# Patient Record
Sex: MALE | Race: WHITE | Employment: FULL TIME | URBAN - METROPOLITAN AREA
[De-identification: names, ages, dates, MRNs, and addresses within clinical notes are randomized per-mention and may not be internally consistent; named-entity substitution may affect disease eponyms.]

---

## 2024-07-03 ENCOUNTER — HOSPITAL ENCOUNTER (OUTPATIENT)
Age: 34
Setting detail: SPECIMEN
Discharge: HOME | End: 2024-07-03

## 2024-07-03 ENCOUNTER — OFFICE VISIT (OUTPATIENT)
Dept: FAMILY MEDICINE CLINIC | Age: 34
End: 2024-07-03
Payer: COMMERCIAL

## 2024-07-03 VITALS
DIASTOLIC BLOOD PRESSURE: 90 MMHG | TEMPERATURE: 97.2 F | HEART RATE: 88 BPM | BODY MASS INDEX: 22.4 KG/M2 | OXYGEN SATURATION: 98 % | WEIGHT: 169 LBS | HEIGHT: 73 IN | SYSTOLIC BLOOD PRESSURE: 170 MMHG

## 2024-07-03 DIAGNOSIS — Z13.1 DIABETES MELLITUS SCREENING: ICD-10-CM

## 2024-07-03 DIAGNOSIS — Z11.4 SCREENING FOR HIV (HUMAN IMMUNODEFICIENCY VIRUS): ICD-10-CM

## 2024-07-03 DIAGNOSIS — Z13.220 SCREENING FOR CHOLESTEROL LEVEL: ICD-10-CM

## 2024-07-03 DIAGNOSIS — E55.9 VITAMIN D INSUFFICIENCY: ICD-10-CM

## 2024-07-03 DIAGNOSIS — Z00.00 ROUTINE HEALTH MAINTENANCE: ICD-10-CM

## 2024-07-03 DIAGNOSIS — Z11.59 NEED FOR HEPATITIS C SCREENING TEST: ICD-10-CM

## 2024-07-03 DIAGNOSIS — I10 PRIMARY HYPERTENSION: ICD-10-CM

## 2024-07-03 DIAGNOSIS — Z76.89 ENCOUNTER TO ESTABLISH CARE: Primary | ICD-10-CM

## 2024-07-03 DIAGNOSIS — Z13.31 DEPRESSION SCREENING: ICD-10-CM

## 2024-07-03 LAB
25(OH)D3 SERPL-MCNC: 29.2 NG/ML (ref 30–100)
ALBUMIN SERPL-MCNC: 4.8 G/DL (ref 3.5–5.2)
ALBUMIN/GLOB SERPL: 2 {RATIO} (ref 1–2.5)
ALP SERPL-CCNC: 60 U/L (ref 40–129)
ALT SERPL-CCNC: 15 U/L (ref 10–50)
ANION GAP SERPL CALCULATED.3IONS-SCNC: 10 MMOL/L (ref 9–16)
AST SERPL-CCNC: 18 U/L (ref 10–50)
BASOPHILS # BLD: 0.12 K/UL (ref 0–0.2)
BASOPHILS NFR BLD: 2 % (ref 0–2)
BILIRUB SERPL-MCNC: 0.3 MG/DL (ref 0–1.2)
BILIRUB UR QL STRIP: NEGATIVE
BUN SERPL-MCNC: 14 MG/DL (ref 6–20)
CALCIUM SERPL-MCNC: 9.3 MG/DL (ref 8.6–10.4)
CHLORIDE SERPL-SCNC: 102 MMOL/L (ref 98–107)
CHOLEST SERPL-MCNC: 259 MG/DL (ref 0–199)
CHOLESTEROL/HDL RATIO: 4
CLARITY UR: CLEAR
CO2 SERPL-SCNC: 27 MMOL/L (ref 20–31)
COLOR UR: YELLOW
COMMENT: ABNORMAL
CREAT SERPL-MCNC: 1 MG/DL (ref 0.7–1.2)
EOSINOPHIL # BLD: 0.11 K/UL (ref 0–0.44)
EOSINOPHILS RELATIVE PERCENT: 2 % (ref 1–4)
ERYTHROCYTE [DISTWIDTH] IN BLOOD BY AUTOMATED COUNT: 11.9 % (ref 11.8–14.4)
EST. AVERAGE GLUCOSE BLD GHB EST-MCNC: 97 MG/DL
GFR, ESTIMATED: >90 ML/MIN/1.73M2
GLUCOSE SERPL-MCNC: 90 MG/DL (ref 74–99)
GLUCOSE UR STRIP-MCNC: NEGATIVE MG/DL
HBA1C MFR BLD: 5 % (ref 4–6)
HCT VFR BLD AUTO: 41.3 % (ref 40.7–50.3)
HCV AB SERPL QL IA: NONREACTIVE
HDLC SERPL-MCNC: 71 MG/DL
HGB BLD-MCNC: 13.2 G/DL (ref 13–17)
HGB UR QL STRIP.AUTO: NEGATIVE
HIV 1+2 AB+HIV1 P24 AG SERPL QL IA: NONREACTIVE
IMM GRANULOCYTES # BLD AUTO: 0.03 K/UL (ref 0–0.3)
IMM GRANULOCYTES NFR BLD: 1 %
KETONES UR STRIP-MCNC: ABNORMAL MG/DL
LDLC SERPL CALC-MCNC: 139 MG/DL (ref 0–100)
LEUKOCYTE ESTERASE UR QL STRIP: NEGATIVE
LYMPHOCYTES NFR BLD: 2.01 K/UL (ref 1.1–3.7)
LYMPHOCYTES RELATIVE PERCENT: 30 % (ref 24–43)
MAGNESIUM SERPL-MCNC: 1.9 MG/DL (ref 1.6–2.6)
MCH RBC QN AUTO: 28.9 PG (ref 25.2–33.5)
MCHC RBC AUTO-ENTMCNC: 32 G/DL (ref 28.4–34.8)
MCV RBC AUTO: 90.6 FL (ref 82.6–102.9)
MONOCYTES NFR BLD: 0.5 K/UL (ref 0.1–1.2)
MONOCYTES NFR BLD: 8 % (ref 3–12)
NEUTROPHILS NFR BLD: 57 % (ref 36–65)
NEUTS SEG NFR BLD: 3.84 K/UL (ref 1.5–8.1)
NITRITE UR QL STRIP: NEGATIVE
NRBC BLD-RTO: 0 PER 100 WBC
PH UR STRIP: 5.5 [PH] (ref 5–8)
PLATELET # BLD AUTO: 310 K/UL (ref 138–453)
PMV BLD AUTO: 10.3 FL (ref 8.1–13.5)
POTASSIUM SERPL-SCNC: 4.8 MMOL/L (ref 3.7–5.3)
PROT SERPL-MCNC: 7.4 G/DL (ref 6.6–8.7)
PROT UR STRIP-MCNC: NEGATIVE MG/DL
RBC # BLD AUTO: 4.56 M/UL (ref 4.21–5.77)
SODIUM SERPL-SCNC: 139 MMOL/L (ref 136–145)
SP GR UR STRIP: 1.02 (ref 1–1.03)
TRIGL SERPL-MCNC: 246 MG/DL
TSH SERPL DL<=0.05 MIU/L-ACNC: 1.24 UIU/ML (ref 0.27–4.2)
UROBILINOGEN UR STRIP-ACNC: NORMAL EU/DL (ref 0–1)
VLDLC SERPL CALC-MCNC: 49 MG/DL
WBC OTHER # BLD: 6.6 K/UL (ref 3.5–11.3)

## 2024-07-03 PROCEDURE — 3080F DIAST BP >= 90 MM HG: CPT | Performed by: FAMILY MEDICINE

## 2024-07-03 PROCEDURE — 99385 PREV VISIT NEW AGE 18-39: CPT | Performed by: FAMILY MEDICINE

## 2024-07-03 PROCEDURE — 3077F SYST BP >= 140 MM HG: CPT | Performed by: FAMILY MEDICINE

## 2024-07-03 PROCEDURE — 93000 ELECTROCARDIOGRAM COMPLETE: CPT | Performed by: FAMILY MEDICINE

## 2024-07-03 RX ORDER — LISINOPRIL 10 MG/1
10 TABLET ORAL DAILY
Qty: 30 TABLET | Refills: 1 | Status: SHIPPED | OUTPATIENT
Start: 2024-07-03

## 2024-07-03 SDOH — ECONOMIC STABILITY: HOUSING INSECURITY
IN THE LAST 12 MONTHS, WAS THERE A TIME WHEN YOU DID NOT HAVE A STEADY PLACE TO SLEEP OR SLEPT IN A SHELTER (INCLUDING NOW)?: NO

## 2024-07-03 SDOH — ECONOMIC STABILITY: FOOD INSECURITY: WITHIN THE PAST 12 MONTHS, THE FOOD YOU BOUGHT JUST DIDN'T LAST AND YOU DIDN'T HAVE MONEY TO GET MORE.: NEVER TRUE

## 2024-07-03 SDOH — ECONOMIC STABILITY: INCOME INSECURITY: HOW HARD IS IT FOR YOU TO PAY FOR THE VERY BASICS LIKE FOOD, HOUSING, MEDICAL CARE, AND HEATING?: NOT HARD AT ALL

## 2024-07-03 SDOH — ECONOMIC STABILITY: FOOD INSECURITY: WITHIN THE PAST 12 MONTHS, YOU WORRIED THAT YOUR FOOD WOULD RUN OUT BEFORE YOU GOT MONEY TO BUY MORE.: NEVER TRUE

## 2024-07-03 ASSESSMENT — ENCOUNTER SYMPTOMS
ALLERGIC/IMMUNOLOGIC NEGATIVE: 1
GASTROINTESTINAL NEGATIVE: 1
SHORTNESS OF BREATH: 0
EYES NEGATIVE: 1
RESPIRATORY NEGATIVE: 1

## 2024-07-03 ASSESSMENT — PATIENT HEALTH QUESTIONNAIRE - PHQ9
SUM OF ALL RESPONSES TO PHQ QUESTIONS 1-9: 0
SUM OF ALL RESPONSES TO PHQ9 QUESTIONS 1 & 2: 0
2. FEELING DOWN, DEPRESSED OR HOPELESS: NOT AT ALL
1. LITTLE INTEREST OR PLEASURE IN DOING THINGS: NOT AT ALL
SUM OF ALL RESPONSES TO PHQ QUESTIONS 1-9: 0

## 2024-07-03 NOTE — PROGRESS NOTES
[facility]     Date of Visit:  7/3/2024  Patient Name: Jace Phillip   Patient :  1990     CHIEF COMPLAINT:     Jace Phillip is a 34 y.o. male who presents today for an general visit to be evaluated for the following condition(s):    Chief Complaint   Patient presents with    Establish Care     New to Clinic and provider.    General Check Up / Physical     Due for check up / wellness exam.  Multiple health maintenance items are due.    Blood Pressure Elevation     Is open to medication.        HISTORY OF PRESENT ILLNESS:         New to clinic and provider.  Needs a routine check up / wellness exam.  Engaged and expecting his first child in 2024.  Works in construction.  Available medical records reviewed this morning.  Chart was updated.    Hypertension  This is a new problem. The current episode started more than 1 month ago. The problem is unchanged. The problem is uncontrolled. Pertinent negatives include no chest pain, headaches, malaise/fatigue, palpitations, peripheral edema or shortness of breath. Risk factors for coronary artery disease include male gender, family history and smoking/tobacco exposure. Past treatments include nothing. There are no compliance problems.         REVIEW OF SYSTEMS:        Review of Systems   Constitutional: Negative.  Negative for malaise/fatigue.   HENT: Negative.     Eyes: Negative.    Respiratory: Negative.  Negative for shortness of breath.    Cardiovascular: Negative.  Negative for chest pain and palpitations.   Gastrointestinal: Negative.    Endocrine: Negative.    Genitourinary: Negative.    Musculoskeletal: Negative.    Skin: Negative.    Allergic/Immunologic: Negative.    Neurological: Negative.  Negative for headaches.   Hematological: Negative.    Psychiatric/Behavioral: Negative.          REVIEWED INFORMATION      Current Outpatient Medications   Medication Sig Dispense Refill    lisinopril (PRINIVIL;ZESTRIL) 10 MG tablet Take

## 2024-07-03 NOTE — PROGRESS NOTES
Visit Information    Have you changed or started any medications since your last visit including any over-the-counter medicines, vitamins, or herbal medicines? no   Are you having any side effects from any of your medications? -  no  Have you stopped taking any of your medications? Is so, why? -  no    Have you seen any other physician or provider since your last visit? No  Have you had any other diagnostic tests since your last visit? No  Have you been seen in the emergency room and/or had an admission to a hospital since we last saw you? No  Have you had your routine dental cleaning in the past 6 months? no    Have you activated your [employer] account? If not, what are your barriers? No:      Patient Care Team:  Levi Martin DO as PCP - General (Family Medicine)    Medical History Review  Past Medical, Family, and Social History reviewed and does not contribute to the patient presenting condition    Health Maintenance   Topic Date Due    COVID-19 Vaccine (1) Never done    Depression Screen  Never done    HIV screen  Never done    Hepatitis C screen  Never done    DTaP/Tdap/Td vaccine (1 - Tdap) Never done    Hepatitis B vaccine (2 of 3 - Hep B Twinrix 3-dose series) 02/08/2008    Polio vaccine (2 of 3 - Adult catch-up series) 02/08/2008    Varicella vaccine (2 of 2 - 13+ 2-dose series) 02/08/2008    Flu vaccine (1) 08/01/2024    Hepatitis A vaccine  Aged Out    Hib vaccine  Aged Out    HPV vaccine  Aged Out    Meningococcal (ACWY) vaccine  Aged Out    Pneumococcal 0-64 years Vaccine  Aged Out

## 2024-08-02 ENCOUNTER — OFFICE VISIT (OUTPATIENT)
Dept: FAMILY MEDICINE CLINIC | Age: 34
End: 2024-08-02

## 2024-08-02 VITALS
DIASTOLIC BLOOD PRESSURE: 84 MMHG | BODY MASS INDEX: 22.64 KG/M2 | OXYGEN SATURATION: 99 % | HEART RATE: 71 BPM | WEIGHT: 171.6 LBS | SYSTOLIC BLOOD PRESSURE: 126 MMHG

## 2024-08-02 DIAGNOSIS — I10 PRIMARY HYPERTENSION: ICD-10-CM

## 2024-08-02 DIAGNOSIS — E78.2 MIXED HYPERLIPIDEMIA: ICD-10-CM

## 2024-08-02 DIAGNOSIS — E55.9 VITAMIN D INSUFFICIENCY: ICD-10-CM

## 2024-08-02 DIAGNOSIS — Z09 FOLLOW-UP EXAMINATION: Primary | ICD-10-CM

## 2024-08-02 DIAGNOSIS — Z71.89 ENCOUNTER FOR MEDICATION REVIEW AND COUNSELING: ICD-10-CM

## 2024-08-02 RX ORDER — LISINOPRIL 10 MG/1
10 TABLET ORAL DAILY
Qty: 90 TABLET | Refills: 1 | Status: SHIPPED | OUTPATIENT
Start: 2024-08-02

## 2024-08-02 ASSESSMENT — ENCOUNTER SYMPTOMS
ALLERGIC/IMMUNOLOGIC NEGATIVE: 1
EYES NEGATIVE: 1
RESPIRATORY NEGATIVE: 1
GASTROINTESTINAL NEGATIVE: 1

## 2024-08-02 NOTE — PROGRESS NOTES
[facility]     Date of Visit:  2024  Patient Name: Jace Phillip   Patient :  1990     CHIEF COMPLAINT:     Jace Phillip is a 34 y.o. male who presents today for an general visit to be evaluated for the following condition(s):    Chief Complaint   Patient presents with    Follow-up     Here for 1 month follow up.    Hypertension     Here for recheck of BP - medication started last visit.    Discuss Labs     Recent labs reviewed - see reports.       HISTORY OF PRESENT ILLNESS:         Hypertension  The current episode started more than 1 month ago. The problem has been gradually improving since onset. Risk factors for coronary artery disease include family history, male gender, smoking/tobacco exposure and dyslipidemia. Past treatments include ACE inhibitors. The current treatment provides moderate improvement. There are no compliance problems.         REVIEW OF SYSTEMS:        Review of Systems   Constitutional: Negative.    HENT: Negative.     Eyes: Negative.    Respiratory: Negative.     Cardiovascular: Negative.    Gastrointestinal: Negative.    Endocrine: Negative.    Genitourinary: Negative.    Musculoskeletal: Negative.    Skin: Negative.    Allergic/Immunologic: Negative.    Neurological: Negative.    Hematological: Negative.    Psychiatric/Behavioral: Negative.          REVIEWED INFORMATION      Current Outpatient Medications   Medication Sig Dispense Refill    lisinopril (PRINIVIL;ZESTRIL) 10 MG tablet Take 1 tablet by mouth daily 90 tablet 1     No current facility-administered medications for this visit.        Allergies   Allergen Reactions    Cefaclor Other (See Comments)       There is no problem list on file for this patient.      History reviewed. No pertinent past medical history.    Past Surgical History:   Procedure Laterality Date    NASAL SEPTUM SURGERY          Social History     Socioeconomic History    Marital status: Single     Spouse name: None    Number

## 2024-08-25 DIAGNOSIS — I10 PRIMARY HYPERTENSION: ICD-10-CM

## 2024-08-26 DIAGNOSIS — I10 PRIMARY HYPERTENSION: ICD-10-CM

## 2024-08-26 RX ORDER — LISINOPRIL 10 MG/1
10 TABLET ORAL DAILY
Qty: 30 TABLET | OUTPATIENT
Start: 2024-08-26

## 2024-08-26 RX ORDER — LISINOPRIL 10 MG/1
10 TABLET ORAL DAILY
Qty: 90 TABLET | Refills: 1 | Status: SHIPPED | OUTPATIENT
Start: 2024-08-26

## 2024-12-17 ENCOUNTER — OFFICE VISIT (OUTPATIENT)
Dept: FAMILY MEDICINE CLINIC | Age: 34
End: 2024-12-17
Payer: COMMERCIAL

## 2024-12-17 VITALS
TEMPERATURE: 99.3 F | OXYGEN SATURATION: 99 % | HEIGHT: 73 IN | WEIGHT: 176 LBS | SYSTOLIC BLOOD PRESSURE: 130 MMHG | HEART RATE: 69 BPM | DIASTOLIC BLOOD PRESSURE: 60 MMHG | BODY MASS INDEX: 23.33 KG/M2

## 2024-12-17 DIAGNOSIS — Z71.89 ENCOUNTER FOR MEDICATION REVIEW AND COUNSELING: ICD-10-CM

## 2024-12-17 DIAGNOSIS — Z09 FOLLOW-UP EXAMINATION: Primary | ICD-10-CM

## 2024-12-17 DIAGNOSIS — E78.2 MIXED HYPERLIPIDEMIA: ICD-10-CM

## 2024-12-17 DIAGNOSIS — I10 PRIMARY HYPERTENSION: ICD-10-CM

## 2024-12-17 DIAGNOSIS — R42 ORTHOSTATIC DIZZINESS: ICD-10-CM

## 2024-12-17 PROCEDURE — 99213 OFFICE O/P EST LOW 20 MIN: CPT | Performed by: FAMILY MEDICINE

## 2024-12-17 PROCEDURE — 3078F DIAST BP <80 MM HG: CPT | Performed by: FAMILY MEDICINE

## 2024-12-17 PROCEDURE — 3075F SYST BP GE 130 - 139MM HG: CPT | Performed by: FAMILY MEDICINE

## 2024-12-17 RX ORDER — LISINOPRIL AND HYDROCHLOROTHIAZIDE 10; 12.5 MG/1; MG/1
1 TABLET ORAL DAILY
Qty: 90 TABLET | Refills: 0 | Status: SHIPPED | OUTPATIENT
Start: 2024-12-17

## 2024-12-17 ASSESSMENT — ENCOUNTER SYMPTOMS
RESPIRATORY NEGATIVE: 1
EYES NEGATIVE: 1
ALLERGIC/IMMUNOLOGIC NEGATIVE: 1
GASTROINTESTINAL NEGATIVE: 1

## 2024-12-17 NOTE — PROGRESS NOTES
[facility]     Date of Visit:  2024  Patient Name: Jace Phillip   Patient :  1990     CHIEF COMPLAINT:     Jace Phillip is a 34 y.o. male who presents today for an general visit to be evaluated for the following condition(s):    Chief Complaint   Patient presents with    Follow-up     Here in follow up to 2024 visit.  Please reference that notation.    Hypertension     On Lisinopril 10mg daily.  BP up as of late.  Associated with some dizziness. When getting up from sitting position for about 4-5 days        HISTORY OF PRESENT ILLNESS:         Hypertension  This is a chronic problem. The current episode started more than 1 month ago. The problem has been waxing and waning since onset. (dizziness) Risk factors for coronary artery disease include male gender, smoking/tobacco exposure, family history and dyslipidemia. The current treatment provides significant improvement.        REVIEW OF SYSTEMS:        Review of Systems   Constitutional: Negative.    HENT: Negative.     Eyes: Negative.    Respiratory: Negative.     Cardiovascular: Negative.    Gastrointestinal: Negative.    Endocrine: Negative.    Genitourinary: Negative.    Musculoskeletal: Negative.    Skin: Negative.    Allergic/Immunologic: Negative.    Neurological:  Positive for dizziness.   Hematological: Negative.    Psychiatric/Behavioral: Negative.          REVIEWED INFORMATION      Current Outpatient Medications   Medication Sig Dispense Refill    lisinopril-hydroCHLOROthiazide (PRINZIDE;ZESTORETIC) 10-12.5 MG per tablet Take 1 tablet by mouth daily 90 tablet 0     No current facility-administered medications for this visit.        Allergies   Allergen Reactions    Cefaclor Other (See Comments)       There is no problem list on file for this patient.      History reviewed. No pertinent past medical history.    Past Surgical History:   Procedure Laterality Date    NASAL SEPTUM SURGERY          Social History

## 2025-02-04 ENCOUNTER — OFFICE VISIT (OUTPATIENT)
Dept: FAMILY MEDICINE CLINIC | Age: 35
End: 2025-02-04
Payer: COMMERCIAL

## 2025-02-04 VITALS
BODY MASS INDEX: 23.59 KG/M2 | HEART RATE: 60 BPM | WEIGHT: 178 LBS | OXYGEN SATURATION: 99 % | SYSTOLIC BLOOD PRESSURE: 132 MMHG | RESPIRATION RATE: 16 BRPM | HEIGHT: 73 IN | DIASTOLIC BLOOD PRESSURE: 84 MMHG

## 2025-02-04 DIAGNOSIS — I10 PRIMARY HYPERTENSION: ICD-10-CM

## 2025-02-04 DIAGNOSIS — Z71.89 ENCOUNTER FOR MEDICATION REVIEW AND COUNSELING: ICD-10-CM

## 2025-02-04 DIAGNOSIS — E78.2 MIXED HYPERLIPIDEMIA: ICD-10-CM

## 2025-02-04 DIAGNOSIS — Z09 FOLLOW-UP EXAMINATION: Primary | ICD-10-CM

## 2025-02-04 PROCEDURE — 3079F DIAST BP 80-89 MM HG: CPT | Performed by: FAMILY MEDICINE

## 2025-02-04 PROCEDURE — 3075F SYST BP GE 130 - 139MM HG: CPT | Performed by: FAMILY MEDICINE

## 2025-02-04 PROCEDURE — 99213 OFFICE O/P EST LOW 20 MIN: CPT | Performed by: FAMILY MEDICINE

## 2025-02-04 ASSESSMENT — ENCOUNTER SYMPTOMS
EYES NEGATIVE: 1
RESPIRATORY NEGATIVE: 1
ALLERGIC/IMMUNOLOGIC NEGATIVE: 1
GASTROINTESTINAL NEGATIVE: 1

## 2025-02-04 ASSESSMENT — PATIENT HEALTH QUESTIONNAIRE - PHQ9
SUM OF ALL RESPONSES TO PHQ QUESTIONS 1-9: 0
1. LITTLE INTEREST OR PLEASURE IN DOING THINGS: NOT AT ALL
SUM OF ALL RESPONSES TO PHQ QUESTIONS 1-9: 0
SUM OF ALL RESPONSES TO PHQ9 QUESTIONS 1 & 2: 0
SUM OF ALL RESPONSES TO PHQ9 QUESTIONS 1 & 2: 0
2. FEELING DOWN, DEPRESSED OR HOPELESS: NOT AT ALL
SUM OF ALL RESPONSES TO PHQ QUESTIONS 1-9: 0
1. LITTLE INTEREST OR PLEASURE IN DOING THINGS: NOT AT ALL
SUM OF ALL RESPONSES TO PHQ QUESTIONS 1-9: 0
2. FEELING DOWN, DEPRESSED OR HOPELESS: NOT AT ALL

## 2025-02-04 NOTE — PROGRESS NOTES
status: Single     Spouse name: None    Number of children: None    Years of education: None    Highest education level: None   Tobacco Use    Smoking status: Never    Smokeless tobacco: Current     Types: Chew   Vaping Use    Vaping status: Never Used   Substance and Sexual Activity    Alcohol use: Yes     Alcohol/week: 6.0 standard drinks of alcohol     Types: 6 Cans of beer per week    Drug use: Never    Sexual activity: Yes     Partners: Female     Social Determinants of Health     Financial Resource Strain: Low Risk  (7/3/2024)    Overall Financial Resource Strain (CARDIA)     Difficulty of Paying Living Expenses: Not hard at all   Food Insecurity: No Food Insecurity (7/3/2024)    Hunger Vital Sign     Worried About Running Out of Food in the Last Year: Never true     Ran Out of Food in the Last Year: Never true   Transportation Needs: Unknown (7/3/2024)    PRAPARE - Transportation     Lack of Transportation (Non-Medical): No   Housing Stability: Unknown (7/3/2024)    Housing Stability Vital Sign     Unstable Housing in the Last Year: No        Family History   Problem Relation Age of Onset    Hypertension Father         PHYSICAL EXAM:       /84 (Site: Right Upper Arm, Position: Sitting, Cuff Size: Medium Adult)   Pulse 60   Resp 16   Ht 1.854 m (6' 1\")   Wt 80.7 kg (178 lb)   SpO2 99%   BMI 23.48 kg/m²        Physical Exam  Vitals and nursing note reviewed.   Constitutional:       General: He is not in acute distress.     Appearance: Normal appearance. He is normal weight. He is not ill-appearing, toxic-appearing or diaphoretic.   HENT:      Head: Normocephalic and atraumatic.      Right Ear: Tympanic membrane, ear canal and external ear normal.      Left Ear: Tympanic membrane, ear canal and external ear normal.      Nose: Nose normal. No congestion or rhinorrhea.      Mouth/Throat:      Mouth: Mucous membranes are moist.      Pharynx: Oropharynx is clear. No oropharyngeal exudate or posterior

## 2025-02-28 DIAGNOSIS — I10 PRIMARY HYPERTENSION: ICD-10-CM

## 2025-02-28 RX ORDER — LISINOPRIL 10 MG/1
10 TABLET ORAL DAILY
Qty: 90 TABLET | Refills: 1 | OUTPATIENT
Start: 2025-02-28

## 2025-02-28 RX ORDER — LISINOPRIL AND HYDROCHLOROTHIAZIDE 10; 12.5 MG/1; MG/1
1 TABLET ORAL DAILY
Qty: 90 TABLET | Refills: 1 | Status: SHIPPED | OUTPATIENT
Start: 2025-02-28

## 2025-03-19 DIAGNOSIS — I10 PRIMARY HYPERTENSION: ICD-10-CM

## 2025-03-19 RX ORDER — LISINOPRIL AND HYDROCHLOROTHIAZIDE 10; 12.5 MG/1; MG/1
1 TABLET ORAL DAILY
Qty: 90 TABLET | Refills: 1 | Status: SHIPPED | OUTPATIENT
Start: 2025-03-19

## 2025-03-19 NOTE — TELEPHONE ENCOUNTER
Jace Phillip is calling to request a refill on the following medication(s):    Last Visit Date (If Applicable):  2/4/2025    Next Visit Date:    8/5/2025    Medication Request:  Requested Prescriptions     Pending Prescriptions Disp Refills    lisinopril-hydroCHLOROthiazide (PRINZIDE;ZESTORETIC) 10-12.5 MG per tablet [Pharmacy Med Name: LISINOPRIL-HCTZ 10/12.5MG TABLETS] 90 tablet 1     Sig: TAKE 1 TABLET BY MOUTH DAILY

## 2025-08-05 ENCOUNTER — OFFICE VISIT (OUTPATIENT)
Dept: FAMILY MEDICINE CLINIC | Age: 35
End: 2025-08-05
Payer: COMMERCIAL

## 2025-08-05 VITALS
DIASTOLIC BLOOD PRESSURE: 78 MMHG | SYSTOLIC BLOOD PRESSURE: 128 MMHG | WEIGHT: 161 LBS | HEIGHT: 73 IN | OXYGEN SATURATION: 100 % | BODY MASS INDEX: 21.34 KG/M2 | HEART RATE: 62 BPM

## 2025-08-05 DIAGNOSIS — Z13.1 DIABETES MELLITUS SCREENING: ICD-10-CM

## 2025-08-05 DIAGNOSIS — Z13.31 DEPRESSION SCREENING: ICD-10-CM

## 2025-08-05 DIAGNOSIS — Z00.00 ROUTINE HEALTH MAINTENANCE: Primary | ICD-10-CM

## 2025-08-05 DIAGNOSIS — I10 PRIMARY HYPERTENSION: ICD-10-CM

## 2025-08-05 DIAGNOSIS — Z11.59 NEED FOR HEPATITIS B SCREENING TEST: ICD-10-CM

## 2025-08-05 DIAGNOSIS — E55.9 VITAMIN D INSUFFICIENCY: ICD-10-CM

## 2025-08-05 DIAGNOSIS — Z71.89 ENCOUNTER FOR MEDICATION REVIEW AND COUNSELING: ICD-10-CM

## 2025-08-05 DIAGNOSIS — E78.2 MIXED HYPERLIPIDEMIA: ICD-10-CM

## 2025-08-05 PROCEDURE — 3078F DIAST BP <80 MM HG: CPT | Performed by: FAMILY MEDICINE

## 2025-08-05 PROCEDURE — 99395 PREV VISIT EST AGE 18-39: CPT | Performed by: FAMILY MEDICINE

## 2025-08-05 PROCEDURE — 3074F SYST BP LT 130 MM HG: CPT | Performed by: FAMILY MEDICINE

## 2025-08-05 SDOH — ECONOMIC STABILITY: FOOD INSECURITY: WITHIN THE PAST 12 MONTHS, YOU WORRIED THAT YOUR FOOD WOULD RUN OUT BEFORE YOU GOT MONEY TO BUY MORE.: NEVER TRUE

## 2025-08-05 SDOH — ECONOMIC STABILITY: FOOD INSECURITY: WITHIN THE PAST 12 MONTHS, THE FOOD YOU BOUGHT JUST DIDN'T LAST AND YOU DIDN'T HAVE MONEY TO GET MORE.: NEVER TRUE

## 2025-08-05 ASSESSMENT — ENCOUNTER SYMPTOMS
ALLERGIC/IMMUNOLOGIC NEGATIVE: 1
GASTROINTESTINAL NEGATIVE: 1
EYES NEGATIVE: 1
RESPIRATORY NEGATIVE: 1